# Patient Record
Sex: FEMALE | Race: WHITE
[De-identification: names, ages, dates, MRNs, and addresses within clinical notes are randomized per-mention and may not be internally consistent; named-entity substitution may affect disease eponyms.]

---

## 2018-06-12 ENCOUNTER — HOSPITAL ENCOUNTER (OUTPATIENT)
Dept: HOSPITAL 80 - FED | Age: 58
Setting detail: OBSERVATION
LOS: 1 days | Discharge: HOME | End: 2018-06-13
Attending: FAMILY MEDICINE | Admitting: FAMILY MEDICINE
Payer: COMMERCIAL

## 2018-06-12 DIAGNOSIS — Z82.49: ICD-10-CM

## 2018-06-12 DIAGNOSIS — R07.89: Primary | ICD-10-CM

## 2018-06-12 PROCEDURE — 71275 CT ANGIOGRAPHY CHEST: CPT

## 2018-06-12 PROCEDURE — G0378 HOSPITAL OBSERVATION PER HR: HCPCS

## 2018-06-12 PROCEDURE — 76705 ECHO EXAM OF ABDOMEN: CPT

## 2018-06-12 PROCEDURE — 71045 X-RAY EXAM CHEST 1 VIEW: CPT

## 2018-06-12 PROCEDURE — 93005 ELECTROCARDIOGRAM TRACING: CPT

## 2018-06-12 PROCEDURE — 99285 EMERGENCY DEPT VISIT HI MDM: CPT

## 2018-06-12 NOTE — EDV
[f 
rep st]



                                                  EMERGENCY DEPARTMENT REPORT





CHIEF COMPLAINT:  Chest pain.



HISTORY OF PRESENT ILLNESS:  Patient is a 58-year-old female, woke up at 7:30 
this morning with nausea and left chest discomfort.  She felt that her bra was 
too tight.  She took her bra off and seemed to feel better; however, symptoms 
have continued.  She has discomfort left anterior chest radiating to her 
scapula.  She describes it as fullness sensation.  No shortness of breath, no 
diaphoresis.  No change in her discomfort with exertion or breathing.  No fever 
or cough.  No unusual leg pain or swelling.  No similar symptoms previously.



REVIEW OF SYSTEMS:  Complete review of systems is significant only for above.



PAST MEDICAL HISTORY:  Seasonal allergies.



ALLERGIES:  She is not allergic to any medications.



SOCIAL HISTORY:  Nonsmoker.  No alcohol.  .



FAMILY HISTORY:  Mom had coronary artery bypass graft at age 68.



PHYSICAL EXAMINATION:  GENERAL:  Alert, well-developed female in mild distress. 
VITAL SIGNS: Significant for blood pressure 143/106.  EYES/EARS/NOSE/THROAT:  
Normal.  HEART:  Regular rate and rhythm without murmur or gallop.  LUNGS:  
Clear.  ABDOMEN:  Soft and nontender.  EXTREMITIES:  Unremarkable.  NEUROLOGIC:
  Normal.  SKIN:  Normal.  PSYCHIATRIC:  Normal.



EMERGENCY DEPARTMENT COURSE:  IV normal saline, monitor.  Patient was given 
aspirin in the emergency department. Her EKG showed normal sinus rhythm, normal 
interval and axis. LVH by voltage. There is T-wave flattening V2 through V5 
with T-wave inversion V3 and V4. Old EKG in March 2013, was normal, so there 
are EKG changes. The remainder of patient's CBC, chemistry, and D-dimer are 
normal. I reviewed her chest x-ray and feel it is normal without evidence of 
pneumothorax or pneumonia.

On reevaluation, patient does not have chest discomfort. She and her  
and I discussed laboratory evaluation, EKG findings and changes, and imaging 
studies. I have recommended admission. She is not sure if she wants to be 
admitted. I called and discussed the case with [facility], Dr. Alcocer, who 
also recommends admission, but is fine with her staying the hospital here. I 
have discussed this further with the patient. She agrees. I consulted and 
discussed the case with Dr. Johnson, hospitalist who agrees to the admission.



MEDICAL DECISION MAKING:  I think the patient likely has acute coronary syndrome
, though no evidence of acute myocardial infarction with 2 normal troponins; 
however, she had suspicious chest discomfort and has EKG changes suspicious for 
ischemia. There is some family history, also has risk factors.



CLINICAL IMPRESSION:  Chest pain with EKG changes.



PLAN:  Admission.





Job #:  [id number]

MTDD

## 2018-06-12 NOTE — GHP
[f 
rep st]



                                                            HISTORY AND PHYSICAL





CHIEF COMPLAINT:  Chest pain.



HPI:  This is a 58-year-old female, who awoke at 7:30 this morning with nausea 
and left-sided chest pressure.  The pressure was described as moderate and 
radiating to her back.  At first, she thought it was related to her bra being 
too tight, but it persisted when she took her bra off.  The pain is not 
exertional.  She denies any history of heart disease.  She denies any fevers or 
chills.  She denies any abdominal pain.  She has not had any similar episodes.



PAST MEDICAL HISTORY:  Seasonal allergies.



PAST SURGICAL HISTORY:  Denies.



HOME MEDICATIONS:  Zyrtec and Flonase.



ALLERGIES:  No known drug allergies.



SOCIAL HISTORY:  She denies any alcohol, tobacco, or illicit drug use.



FAMILY HISTORY:  Reviewed and significant for coronary artery disease in her 
mother who had bypass surgery in her late 60s or early 70s.



REVIEW OF SYSTEMS:  Comprehensive 10-point review of systems was done and is 
negative except for as mentioned in the HPI.



PHYSICAL EXAM:  VITAL SIGNS:  Blood pressure 148/89, pulse of 80, respiratory 
rate 18, O2 saturation 92% on room air.  GENERAL:  No acute distress.  HEAD:  
Normocephalic, atraumatic.  EYES:  PERRLA.  Sclerae anicteric.  MOUTH:  Moist 
mucous membranes.  NECK:  Supple.  No lymphadenopathy.  CARDIOVASCULAR:  S1, 
S2.  No murmurs, rubs, clicks, gallops.  No JVD.  No lower extremity edema.  
PULMONARY:  Lungs are clear.  No wheezes, rales, or rhonchi.  ABDOMEN:  Soft.  
There is a positive Peck sign.  Bowel sounds are normoactive.  EXTREMITIES:  
No clubbing or cyanosis.  NEURO:  Cranial nerves 2-12 grossly intact.  No focal 
motor or sensory deficits.  SKIN: Clear, no rashes.



DIAGNOSTICS:  EKG which I visualized and personally interpreted shows some 
precordial ST flattening which is new compared to an EKG done a few years back. 



Troponin was reported to be negative x2.  No other labs available.  



Chest x-ray as been done but has not yet been reported.



ASSESSMENT AND PLAN:  This is a 58-year-old female presenting with: 

1.  Chest pressure that appears to be atypical per history but with EKG 
changes. 

Plan: The patient will be placed on observation where we will cycle her 
troponins and arrange for treadmill stress test in the morning.

1.  Positive Peck sign.  

Plan: I think it would be reasonable to further evaluate biliary disease as the 
etiology for her symptoms.  Will ensure liver function tests have been drawn 
and order abdominal ultrasound to further evaluate for gallstones.

1.  The patient requests to be full code status.





Job #:  [id number]

MTDD

## 2018-06-13 VITALS — DIASTOLIC BLOOD PRESSURE: 95 MMHG | SYSTOLIC BLOOD PRESSURE: 118 MMHG

## 2018-06-13 NOTE — ASMTCASEMG
Living Arrangements

 

What is your living           Answers:  With Spouse                           

arrangement? Who do you                                                       

live with?                                                                    

Type Of Residence

 

What kind of residence do     Answers:  House                                 

you live in?                                                                  

Discharge Plan Comments

 

Coordination Status           

Comments                      

Notes:

Pts case discussed in morning rounds. Pt is a 57 y/o female admitted for chest pain. Pt has [facility] 

insurance. Dr. Cushing is recommending a stress test w/out cardiolyte. Pt called [facility] and was 

informed that the hospital would need to submit an auth in order for the stress test to go 

forward. SHIMA Vásquez called Meagan (P# [id number]) at [facility] and got auth. Pt will most likely d/c 

without any needs. CM available for changes.







Plan: Independent 

 

Date Signed:  06/13/2018 02:48 PM

Electronically Signed By:TAVARES Desai

## 2018-06-13 NOTE — CPR
[f rep st]



                                                  NONINVASIVE CARDIAC PROCEDURE REPORT





DATE OF PROCEDURE:  2018



PROCEDURE:  Exercise treadmill test.



INDICATION:  The patient is a 58-year-old female who presented to the hospital with chest pain.  She 
woke up her day of admission with chest discomfort that she described as having something stuck in he
r throat.  This persisted throughout the day and was associated with some mild nausea.  She denies an
y shortness of breath or diaphoresis.  She denies any history of diabetes, hypertension, hyperlipidem
ia, or tobacco use.  She does have a family history of premature coronary artery disease.  Her matern
al grandfather had an MI in his late 30s and ultimately  in his 70s from a cardiac event.



PROCEDURE IN DETAIL:  Consent was obtained, and the patient was placed on continuous telemetry.  Her 
resting EKG revealed inferior ST-T wave changes as well as anterior and lateral T-wave flattening.  T
he patient walked on the treadmill for 6 minutes without any associated chest discomfort.  The exerci
se portion of the study was discontinued secondary to maximal effort.  She reached 98% of her age pre
dicted maximum heart rate, which was 157 beats per minute.  She remained in normal sinus rhythm witho
ut any changes in her ST abnormalities.  Her blood pressure at rest was 124/80 and increased to 184/9
0.  It returned to baseline 5 minutes into recovery.



PLAN:  Equivocal exercise treadmill test.  If there is a high suspicion for coronary artery disease, 
I would recommend a more sophisticated study such as a nuclear stress test.





Job #:  [id number]

## 2018-06-13 NOTE — CPEKG
Heart Rate: 73

RR Interval: 822

P-R Interval: 176

QRSD Interval: 78

QT Interval: 440

QTC Interval: 485

P Axis: 19

QRS Axis: 1

T Wave Axis: -20

EKG Severity - ABNORMAL ECG -

EKG Impression: SINUS RHYTHM

EKG Impression: LEFT VENTRICULAR HYPERTROPHY

EKG Impression: ABNORMAL T, CONSIDER ISCHEMIA, INFERIOR LEADS

EKG Impression: INFERIOR T CHANGES WERE NOTED IN 2013

Electronically Signed By: Vel Carlson 13-Jun-2018 11:38:12

## 2018-06-14 LAB — PLATELET # BLD: 280 10^3/UL (ref 150–400)
